# Patient Record
Sex: FEMALE | Race: WHITE | Employment: OTHER | URBAN - METROPOLITAN AREA
[De-identification: names, ages, dates, MRNs, and addresses within clinical notes are randomized per-mention and may not be internally consistent; named-entity substitution may affect disease eponyms.]

---

## 2017-02-21 ENCOUNTER — APPOINTMENT (OUTPATIENT)
Dept: MAMMOGRAPHY | Age: 47
End: 2017-02-21
Attending: OBSTETRICS & GYNECOLOGY

## 2017-02-23 ENCOUNTER — HOSPITAL ENCOUNTER (OUTPATIENT)
Dept: MAMMOGRAPHY | Age: 47
Discharge: HOME | End: 2017-02-23
Attending: OBSTETRICS & GYNECOLOGY
Payer: COMMERCIAL

## 2017-02-23 DIAGNOSIS — N63.10 BILATERAL BREAST LUMP: ICD-10-CM

## 2017-02-23 DIAGNOSIS — N63.20 BILATERAL BREAST LUMP: ICD-10-CM

## 2017-02-23 PROCEDURE — 77066 DX MAMMO INCL CAD BI: CPT

## 2017-02-23 PROCEDURE — 76642 ULTRASOUND BREAST LIMITED: CPT

## 2025-01-16 SDOH — HEALTH STABILITY: PHYSICAL HEALTH: ON AVERAGE, HOW MANY MINUTES DO YOU ENGAGE IN EXERCISE AT THIS LEVEL?: 0 MIN

## 2025-01-16 SDOH — HEALTH STABILITY: PHYSICAL HEALTH: ON AVERAGE, HOW MANY DAYS PER WEEK DO YOU ENGAGE IN MODERATE TO STRENUOUS EXERCISE (LIKE A BRISK WALK)?: 0 DAYS

## 2025-01-17 ENCOUNTER — OFFICE VISIT (OUTPATIENT)
Dept: INTERNAL MEDICINE CLINIC | Facility: CLINIC | Age: 55
End: 2025-01-17
Payer: COMMERCIAL

## 2025-01-17 VITALS
OXYGEN SATURATION: 98 % | HEART RATE: 91 BPM | SYSTOLIC BLOOD PRESSURE: 126 MMHG | TEMPERATURE: 98.2 F | BODY MASS INDEX: 28.77 KG/M2 | DIASTOLIC BLOOD PRESSURE: 86 MMHG | HEIGHT: 66 IN | WEIGHT: 179 LBS

## 2025-01-17 DIAGNOSIS — G89.29 CHRONIC RIGHT SHOULDER PAIN: ICD-10-CM

## 2025-01-17 DIAGNOSIS — M25.511 CHRONIC RIGHT SHOULDER PAIN: ICD-10-CM

## 2025-01-17 DIAGNOSIS — M75.01 ADHESIVE CAPSULITIS OF RIGHT SHOULDER: ICD-10-CM

## 2025-01-17 DIAGNOSIS — M79.671 RIGHT FOOT PAIN: ICD-10-CM

## 2025-01-17 DIAGNOSIS — Z76.89 ENCOUNTER TO ESTABLISH CARE: Primary | ICD-10-CM

## 2025-01-17 PROCEDURE — 99204 OFFICE O/P NEW MOD 45 MIN: CPT | Performed by: NURSE PRACTITIONER

## 2025-01-17 SDOH — ECONOMIC STABILITY: FOOD INSECURITY: WITHIN THE PAST 12 MONTHS, YOU WORRIED THAT YOUR FOOD WOULD RUN OUT BEFORE YOU GOT MONEY TO BUY MORE.: NEVER TRUE

## 2025-01-17 SDOH — ECONOMIC STABILITY: FOOD INSECURITY: WITHIN THE PAST 12 MONTHS, THE FOOD YOU BOUGHT JUST DIDN'T LAST AND YOU DIDN'T HAVE MONEY TO GET MORE.: NEVER TRUE

## 2025-01-17 ASSESSMENT — PATIENT HEALTH QUESTIONNAIRE - PHQ9
2. FEELING DOWN, DEPRESSED OR HOPELESS: NOT AT ALL
SUM OF ALL RESPONSES TO PHQ QUESTIONS 1-9: 0
SUM OF ALL RESPONSES TO PHQ9 QUESTIONS 1 & 2: 0
SUM OF ALL RESPONSES TO PHQ QUESTIONS 1-9: 0
1. LITTLE INTEREST OR PLEASURE IN DOING THINGS: NOT AT ALL

## 2025-01-17 ASSESSMENT — ENCOUNTER SYMPTOMS
NAUSEA: 0
VOMITING: 0
ABDOMINAL PAIN: 0
EYE PAIN: 0
DIARRHEA: 0
BACK PAIN: 0
RHINORRHEA: 0
SINUS PAIN: 0
SHORTNESS OF BREATH: 0
SORE THROAT: 0
COUGH: 0
CONSTIPATION: 0

## 2025-01-17 NOTE — PROGRESS NOTES
foot pain). Negative for back pain, gait problem and joint swelling.   Skin:  Negative for rash and wound.   Neurological:  Negative for dizziness and headaches.   Psychiatric/Behavioral:  Negative for behavioral problems, sleep disturbance and suicidal ideas. The patient is not nervous/anxious.                 Vitals:    01/17/25 0828   BP: 126/86   Site: Right Upper Arm   Position: Sitting   Cuff Size: Medium Adult   Pulse: 91   Temp: 98.2 °F (36.8 °C)   SpO2: 98%   Weight: 81.2 kg (179 lb)   Height: 1.676 m (5' 6\")              Physical Exam  Physical Exam  Constitutional:       General: She is not in acute distress.     Appearance: She is not ill-appearing.   HENT:      Head: Normocephalic and atraumatic.   Eyes:      Pupils: Pupils are equal, round, and reactive to light.   Cardiovascular:      Rate and Rhythm: Normal rate and regular rhythm.   Pulmonary:      Effort: Pulmonary effort is normal. No respiratory distress.      Breath sounds: Normal breath sounds.   Abdominal:      General: Bowel sounds are normal.      Palpations: Abdomen is soft.   Musculoskeletal:         General: Tenderness present.      Cervical back: Neck supple.        Feet:       Comments: Right shoulder joint limited active ROM - limited flexion to approx to approx 40 degrees, limited adduction/internal rotation, limited external rotation.     Feet:      Comments: R foot non edematous, non erythematous.  R foot between 4th and 5th metatarsal c/o discomfort on palpation, no palpable lump or lesion.  Skin:     General: Skin is warm and dry.   Neurological:      General: No focal deficit present.      Mental Status: She is alert and oriented to person, place, and time.   Psychiatric:         Mood and Affect: Mood normal.         Thought Content: Thought content normal.                Assessment/Plan:          ICD-10-CM    1. Chronic right shoulder pain  M25.511 XR SHOULDER RIGHT (MIN 2 VIEWS)    G89.29 [facility]

## 2025-02-07 ENCOUNTER — OFFICE VISIT (OUTPATIENT)
Dept: INTERNAL MEDICINE CLINIC | Facility: CLINIC | Age: 55
End: 2025-02-07
Payer: COMMERCIAL

## 2025-02-07 VITALS
HEIGHT: 66 IN | DIASTOLIC BLOOD PRESSURE: 80 MMHG | OXYGEN SATURATION: 99 % | HEART RATE: 66 BPM | TEMPERATURE: 98.2 F | SYSTOLIC BLOOD PRESSURE: 128 MMHG | BODY MASS INDEX: 28.9 KG/M2 | WEIGHT: 179.8 LBS

## 2025-02-07 DIAGNOSIS — Z00.00 ROUTINE GENERAL MEDICAL EXAMINATION AT A HEALTH CARE FACILITY: Primary | ICD-10-CM

## 2025-02-07 DIAGNOSIS — Z00.00 ROUTINE GENERAL MEDICAL EXAMINATION AT A HEALTH CARE FACILITY: ICD-10-CM

## 2025-02-07 LAB
ALBUMIN SERPL-MCNC: 4.3 G/DL (ref 3.5–5)
ALBUMIN/GLOB SERPL: 1.3 (ref 1–1.9)
ALP SERPL-CCNC: 65 U/L (ref 35–104)
ALT SERPL-CCNC: 45 U/L (ref 8–45)
ANION GAP SERPL CALC-SCNC: 13 MMOL/L (ref 7–16)
APPEARANCE UR: CLEAR
AST SERPL-CCNC: 40 U/L (ref 15–37)
BASOPHILS # BLD: 0.04 K/UL (ref 0–0.2)
BASOPHILS NFR BLD: 0.8 % (ref 0–2)
BILIRUB SERPL-MCNC: 0.4 MG/DL (ref 0–1.2)
BILIRUB UR QL: NEGATIVE
BUN SERPL-MCNC: 7 MG/DL (ref 6–23)
CALCIUM SERPL-MCNC: 10.3 MG/DL (ref 8.8–10.2)
CHLORIDE SERPL-SCNC: 103 MMOL/L (ref 98–107)
CHOLEST SERPL-MCNC: 317 MG/DL (ref 0–200)
CO2 SERPL-SCNC: 23 MMOL/L (ref 20–29)
COLOR UR: NORMAL
CREAT SERPL-MCNC: 0.79 MG/DL (ref 0.6–1.1)
DIFFERENTIAL METHOD BLD: ABNORMAL
EOSINOPHIL # BLD: 0.15 K/UL (ref 0–0.8)
EOSINOPHIL NFR BLD: 2.9 % (ref 0.5–7.8)
ERYTHROCYTE [DISTWIDTH] IN BLOOD BY AUTOMATED COUNT: 13.8 % (ref 11.9–14.6)
GLOBULIN SER CALC-MCNC: 3.2 G/DL (ref 2.3–3.5)
GLUCOSE SERPL-MCNC: 93 MG/DL (ref 70–99)
GLUCOSE UR STRIP.AUTO-MCNC: NEGATIVE MG/DL
HCT VFR BLD AUTO: 44.6 % (ref 35.8–46.3)
HDLC SERPL-MCNC: 82 MG/DL (ref 40–60)
HDLC SERPL: 3.9 (ref 0–5)
HGB BLD-MCNC: 14.6 G/DL (ref 11.7–15.4)
HGB UR QL STRIP: NEGATIVE
IMM GRANULOCYTES # BLD AUTO: 0.01 K/UL (ref 0–0.5)
IMM GRANULOCYTES NFR BLD AUTO: 0.2 % (ref 0–5)
KETONES UR QL STRIP.AUTO: NEGATIVE MG/DL
LDLC SERPL CALC-MCNC: 214 MG/DL (ref 0–100)
LEUKOCYTE ESTERASE UR QL STRIP.AUTO: NEGATIVE
LYMPHOCYTES # BLD: 1.64 K/UL (ref 0.5–4.6)
LYMPHOCYTES NFR BLD: 31.3 % (ref 13–44)
MCH RBC QN AUTO: 27.9 PG (ref 26.1–32.9)
MCHC RBC AUTO-ENTMCNC: 32.7 G/DL (ref 31.4–35)
MCV RBC AUTO: 85.1 FL (ref 82–102)
MONOCYTES # BLD: 0.41 K/UL (ref 0.1–1.3)
MONOCYTES NFR BLD: 7.8 % (ref 4–12)
NEUTS SEG # BLD: 2.99 K/UL (ref 1.7–8.2)
NEUTS SEG NFR BLD: 57 % (ref 43–78)
NITRITE UR QL STRIP.AUTO: NEGATIVE
NRBC # BLD: 0 K/UL (ref 0–0.2)
PH UR STRIP: 7.5 (ref 5–9)
PLATELET # BLD AUTO: 248 K/UL (ref 150–450)
PMV BLD AUTO: 10.9 FL (ref 9.4–12.3)
POTASSIUM SERPL-SCNC: 4.1 MMOL/L (ref 3.5–5.1)
PROT SERPL-MCNC: 7.5 G/DL (ref 6.3–8.2)
PROT UR STRIP-MCNC: NEGATIVE MG/DL
RBC # BLD AUTO: 5.24 M/UL (ref 4.05–5.2)
SODIUM SERPL-SCNC: 140 MMOL/L (ref 136–145)
SP GR UR REFRACTOMETRY: <1.005 (ref 1–1.02)
TRIGL SERPL-MCNC: 110 MG/DL (ref 0–150)
TSH, 3RD GENERATION: 1.5 UIU/ML (ref 0.27–4.2)
UROBILINOGEN UR QL STRIP.AUTO: 0.2 EU/DL (ref 0.2–1)
VLDLC SERPL CALC-MCNC: 22 MG/DL (ref 6–23)
WBC # BLD AUTO: 5.2 K/UL (ref 4.3–11.1)

## 2025-02-07 PROCEDURE — 99396 PREV VISIT EST AGE 40-64: CPT | Performed by: NURSE PRACTITIONER

## 2025-02-07 SDOH — ECONOMIC STABILITY: FOOD INSECURITY: WITHIN THE PAST 12 MONTHS, YOU WORRIED THAT YOUR FOOD WOULD RUN OUT BEFORE YOU GOT MONEY TO BUY MORE.: NEVER TRUE

## 2025-02-07 SDOH — ECONOMIC STABILITY: FOOD INSECURITY: WITHIN THE PAST 12 MONTHS, THE FOOD YOU BOUGHT JUST DIDN'T LAST AND YOU DIDN'T HAVE MONEY TO GET MORE.: NEVER TRUE

## 2025-02-07 ASSESSMENT — ENCOUNTER SYMPTOMS
EYE PAIN: 0
CONSTIPATION: 0
RHINORRHEA: 0
ABDOMINAL PAIN: 0
SHORTNESS OF BREATH: 0
VOMITING: 0
SORE THROAT: 0
NAUSEA: 0
COUGH: 0
SINUS PAIN: 0
BACK PAIN: 0
DIARRHEA: 0

## 2025-02-07 NOTE — PATIENT INSTRUCTIONS
Welcome our practice and to our [address] [facility] family.  Thank you for choosing us as your health care provider.  In the coming days, you may receive a survey about your visit via text or email.  Please take a few minutes to answer these questions.   As our patient, we value you opinion and appreciate your feedback   about your [employer] experience.      Thank you    [facility]  [phone]  [facility]           Please arrive 20 minutes prior to your appointment time to allow time for checking in at the  and rooming with the medical assistant. Please bring your medication bottles at each visit.

## 2025-02-07 NOTE — PROGRESS NOTES
[facility]  [address]  [facility] [address]  Tel# [phone]  Fax# [phone]     Odette Abrams, LELAND, FNP-BC  Family Nurse Practitioner            Date of Visit: 2025     Leticia Irene (: 1970) is a 54 y.o. female established patient, here for evaluation of the following chief complaint(s):    Chief Complaint   Patient presents with    Annual Exam     Patient is here for her annual physical.         Patient Care Team:  Odette Abrams, BRITTANY - CNP as PCP - General (Nurse Practitioner, Family)  Odette Abrams APRN - CNP as PCP - Empaneled Provider         History of Present Illness              Physical  Presents here for physical with fasting labs.      Diet  B - oatmeal with seeds          Right Shoulder Pain  Noticed she started having right shoulder limitation about 3 months ago.  Denies any falls or injury. Noticed when she tried to reach something off shelf at grocery store, unable to reach to it with pain. States she is right handed. Denies pain when resting. Denies repetitive movement      Has upcoming appt with Dr. ANNAMARIA Warner of [facility] 2/10/2025.                Eye exam: 2.5 years ago  Dental: due   Mammogram: years ago; states \"I don't want one\"; mother had breast CA in , negative for BRCA gene  Pap: years ago, ?  Colon screening: refused, denies family hx of colon CA         Immunization History   Administered Date(s) Administered    COVID-19, MODERNA, (age 12y+), IM, 50mcg/0.5mL 2024            Social History     Substance and Sexual Activity   Alcohol Use Yes    Alcohol/week: 2.0 standard drinks of alcohol    Types: 2 Glasses of wine per week    Comment: per week        Tobacco Use: Low Risk  (2025)    Patient History     Smoking Tobacco Use: Never     Smokeless Tobacco Use: Never     Passive Exposure: Never        Patient Active Problem List   Diagnosis    Chronic right shoulder pain    Right foot pain         Past Medical History:   Diagnosis Date

## 2025-02-10 ENCOUNTER — OFFICE VISIT (OUTPATIENT)
Dept: ORTHOPEDIC SURGERY | Age: 55
End: 2025-02-10
Payer: COMMERCIAL

## 2025-02-10 DIAGNOSIS — M75.01 ADHESIVE CAPSULITIS OF RIGHT SHOULDER: Primary | ICD-10-CM

## 2025-02-10 DIAGNOSIS — E78.2 MIXED HYPERLIPIDEMIA: Primary | ICD-10-CM

## 2025-02-10 DIAGNOSIS — M25.511 RIGHT SHOULDER PAIN, UNSPECIFIED CHRONICITY: ICD-10-CM

## 2025-02-10 PROCEDURE — 99203 OFFICE O/P NEW LOW 30 MIN: CPT | Performed by: STUDENT IN AN ORGANIZED HEALTH CARE EDUCATION/TRAINING PROGRAM

## 2025-02-10 RX ORDER — ROSUVASTATIN CALCIUM 10 MG/1
10 TABLET, COATED ORAL NIGHTLY
Qty: 90 TABLET | Refills: 0 | Status: SHIPPED | OUTPATIENT
Start: 2025-02-10

## 2025-02-10 NOTE — PATIENT INSTRUCTIONS
Frozen Shoulder  Frozen shoulder, also called adhesive capsulitis, causes pain and stiffness in the shoulder. Over time, the shoulder becomes very hard to move.    After a period of worsening symptoms, frozen shoulder tends to get better, although full recovery may take up to 3 years. Physical therapy, with a focus on shoulder flexibility, is the primary treatment recommendation for frozen shoulder.    Frozen shoulder most commonly affects people between the ages of 40 and 60, and occurs in women more often than men. In addition, people with diabetes are at an increased risk for developing frozen shoulder.    Anatomy  Your shoulder is a ball-and-socket joint made up of three bones: your upper arm bone (humerus), your shoulder blade (scapula), and your collarbone (clavicle).    The head of the upper arm bone fits into a shallow socket in your shoulder blade. Strong connective tissue, called the shoulder capsule, surrounds the joint.    To help your shoulder move more easily, synovial fluid lubricates the shoulder capsule and the joint.    Description  In frozen shoulder, the shoulder capsule thickens and becomes stiff and tight. Thick bands of tissue -- called adhesions -- develop. In many cases, there is less synovial fluid in the joint.    The hallmark signs of this condition are severe pain and being unable to move your shoulder -- either on your own or with the help of someone else. It develops in three stages:    Stage 1: Freezing  In the \"freezing\" stage, you slowly have more and more pain. As the pain worsens, your shoulder loses range of motion. Freezing typically lasts from 6 weeks to 9 months.    Stage 2: Frozen  Painful symptoms may actually improve during this stage, but the stiffness remains. During the 4 to 6 months of the \"frozen\" stage, daily activities may be very difficult.    Stage 3: Thawing  Shoulder motion slowly improves during the \"thawing\" stage. Complete return to normal or close to

## 2025-02-10 NOTE — PROGRESS NOTES
Name: Leticia Irene  YOB: 1970  Gender: female  MRN: [id number]  Date of Encounter:  2/10/2025       CHIEF COMPLAINT:     Chief Complaint   Patient presents with    Shoulder Pain     Right        SUBJECTIVE/OBJECTIVE:      HPI:    Leticia Irene  is a 54 y.o. pleasant female who presents today for a new evaluation of her right shoulder pain.    She presents for approximately 3 months of right shoulder pain.  There was no history of trauma. She has fiarly minimal discomfort but poor range of motion of the shoulder, making ADLs difficult. She was told by her PCP she has frozen shoulder. She has had no formal treatment at this point.      PAST HISTORY:   Past medical, surgical, family, social history and allergies reviewed by me.   Tobacco use:  reports that she has never smoked. She has never been exposed to tobacco smoke. She has never used smokeless tobacco.  Diabetes: none  No results found for: \"LABA1C\"      REVIEW OF SYSTEMS:   As noted in HPI.     PHYSICAL EXAMINATION:     Gen: Well-developed, no acute distress   HEENT: NC/AT, EOMI   Neck: Trachea midline, normal ROM   CV: Regular rhythm by palpation of distal pulse, normal capillary refill   Pulm: No respiratory distress, no stridor   Psychiatric: Well oriented, normal mood and affect.   Skin: No rashes, lesions or ulcers, normal temperature, turgor, and texture on uninvolved extremity.      ORTHO EXAM:    Right Shoulder:     Inspection: no biceps deformity; no notable atrophy or erythema  ROM active  passive: FF and abduction approximately 100, both actively and passively.  Ex rotation is decreased, 30. Int rotation: Back pocket  Tenderness: No tenderness  Strength: Abduction 5/5, External rotation 5/5, Internal rotation 5/5  Provocative tests: Negative Belly press.  Empty can without significant discomfort.  There is discomfort with Khan.  Normal capillary refill / 2+ radial pulse   Sensation intact to light touch

## 2025-03-03 ENCOUNTER — HOSPITAL ENCOUNTER (OUTPATIENT)
Dept: PHYSICAL THERAPY | Age: 55
Setting detail: RECURRING SERIES
Discharge: HOME | End: 2025-03-06
Attending: STUDENT IN AN ORGANIZED HEALTH CARE EDUCATION/TRAINING PROGRAM
Payer: COMMERCIAL

## 2025-03-03 DIAGNOSIS — M25.511 PAIN IN SHOULDER REGION, RIGHT: Primary | ICD-10-CM

## 2025-03-03 DIAGNOSIS — M62.81 MUSCLE WEAKNESS (GENERALIZED): ICD-10-CM

## 2025-03-03 DIAGNOSIS — M25.611 STIFFNESS OF RIGHT SHOULDER, NOT ELSEWHERE CLASSIFIED: ICD-10-CM

## 2025-03-03 PROCEDURE — 97140 MANUAL THERAPY 1/> REGIONS: CPT

## 2025-03-03 PROCEDURE — 97110 THERAPEUTIC EXERCISES: CPT

## 2025-03-03 PROCEDURE — 97161 PT EVAL LOW COMPLEX 20 MIN: CPT

## 2025-03-03 ASSESSMENT — PAIN SCALES - GENERAL: PAINLEVEL_OUTOF10: 2

## 2025-03-03 NOTE — PROGRESS NOTES
[facility] DEP   2/13/2026 10:00 AM Odette Abrams, APRN - CNP [facility] DEP      Access Code: [email or web address]  URL: [email or web address]  Date: 03/03/2025  Prepared by: Aleida Mitchell    Exercises  - Seated Thoracic Lumbar Extension with Pectoralis Stretch  - 2 x daily - 7 x weekly - 1 sets - 10 reps - 5 hold  - Standing 'L' Stretch at Counter  - 2 x daily - 7 x weekly - 1 sets - 10 reps - 5 hold  - Seated Shoulder Flexion AAROM with Pulley Behind  - 2 x daily - 7 x weekly - 2 sets - 10 reps - 5 hold  - Seated Shoulder Abduction AAROM with Pulley Behind  - 2 x daily - 7 x weekly - 2 sets - 10 reps - 5 hold  - Seated Upper Trapezius Stretch  - 2 x daily - 7 x weekly - 1 sets - 10 reps - 5 hold  - Seated Levator Scapulae Stretch  - 2 x daily - 7 x weekly - 1 sets - 10 reps - 5 hold  - Seated Shoulder External Rotation PROM on Table  - 2 x daily - 7 x weekly - 1 sets - 10 reps - 5 hold  - Standing Shoulder Internal Rotation AAROM with Dowel  - 2 x daily - 7 x weekly - 1 sets - 10 reps - 5 hold

## 2025-03-03 NOTE — THERAPY EVALUATION
Leticia Irene  : 1970  Primary: [place] [employer] [place] [employer])  Secondary:  [facility] [place] [address]  [address] [place] [address]  Phone: [phone]  Fax: [phone] Plan Frequency: 1-2x/wk for 6 weeks  Plan of Care/Certification Expiration Date: 25        Plan of Care/Certification Expiration Date:  Plan of Care/Certification Expiration Date: 25    Frequency/Duration: Plan Frequency: 1-2x/wk for 6 weeks      Time In/Out:   Time In: 0900  Time Out: 09      PT Visit Info:    Total # of Visits to Date: 1      Visit Count:  1                OUTPATIENT PHYSICAL THERAPY:             Initial Assessment 3/3/2025               Episode (Frozen shoulder R)         Treatment Diagnosis:     Pain in shoulder region, right  Stiffness of right shoulder, not elsewhere classified  Muscle weakness (generalized)  Medical/Referring Diagnosis:    Adhesive capsulitis of right shoulder [M75.01]    Referring Physician:  Rabia Warner MD MD Orders:  PT Eval and Treat, 2-3x/wk for 6 weeks   Return MD Appt:  3/31/25  Date of Onset:  Onset Date: 10/01/24     Allergies:  Erythromycin  Restrictions/Precautions:    None      Medications Last Reviewed: 3/3/2025     SUBJECTIVE   History of Injury/Illness (Reason for Referral):  Pt reports she is unsure of how it began but she noticed it 10/24 while trying to reach for an item on a high shelf in whole foods. Notes she has been taking a supplement to help with inflammation and after taking it for 50 days it has helped some. MD offered an injection which she declined for now. Was given some stretches but has not started them yet.   Patient Stated Goal(s):  \"improve function of her arm\"  Initial Pain Level:      2/10   Post Session Pain Level:     3/10  Past Medical History/Comorbidities:   Ms. Irene  has a past medical history of COVID-19, Cystitis, Hearing loss, Hematuria, and UTI (urinary tract infection).  Ms. Irene  has a past

## 2025-03-10 ENCOUNTER — HOSPITAL ENCOUNTER (OUTPATIENT)
Dept: PHYSICAL THERAPY | Age: 55
Setting detail: RECURRING SERIES
Discharge: HOME | End: 2025-03-13
Attending: STUDENT IN AN ORGANIZED HEALTH CARE EDUCATION/TRAINING PROGRAM
Payer: COMMERCIAL

## 2025-03-10 PROCEDURE — 97110 THERAPEUTIC EXERCISES: CPT

## 2025-03-10 PROCEDURE — 97140 MANUAL THERAPY 1/> REGIONS: CPT

## 2025-03-10 ASSESSMENT — PAIN SCALES - GENERAL: PAINLEVEL_OUTOF10: 0

## 2025-03-10 NOTE — PROGRESS NOTES
Leticia Irene  : 1970  Primary: [place] [employer] ([place] [employer])  Secondary:  O [facility]  [address]  [address]  [facility] [address]  Phone: [phone]  Fax: [phone] Plan Frequency: 1-2x/wk for 6 weeks  Plan of Care/Certification Expiration Date: 25        Plan of Care/Certification Expiration Date:  Plan of Care/Certification Expiration Date: 25    Frequency/Duration: Plan Frequency: 1-2x/wk for 6 weeks      Time In/Out:   Time In: 855  Time Out: 947      PT Visit Info:    Total # of Visits to Date: 2      Visit Count:  2    OUTPATIENT PHYSICAL THERAPY:   Treatment Note 3/10/2025       Episode  (Frozen shoulder R)               Treatment Diagnosis:    Pain in shoulder region, right  Stiffness of right shoulder, not elsewhere classified  Muscle weakness (generalized)  Medical/Referring Diagnosis:    Adhesive capsulitis of right shoulder [M75.01]    Referring Physician:  Rabia Warner MD MD Orders:  PT Eval and Treat, 2-3x/wk for 6 weeks  Return MD Appt:  3/31/25   Date of Onset:  Onset Date: 10/01/24     Allergies:   Erythromycin  Restrictions/Precautions:   None      Interventions Planned (Treatment may consist of any combination of the following):     See Assessment Note    Subjective Comments:   Pt reports stiffness and weakness is maybe a little better.   Initial Pain Level:     0/10  Post Session Pain Level:      1/10  Medications Last Reviewed: 3/10/2025  Updated Objective Findings:  None Today  Treatment   THERAPEUTIC EXERCISE: (15 minutes):    Exercises per grid below to improve mobility, strength, and coordination.  Required minimal verbal and tactile cues to promote proper body alignment, promote proper body posture, and promote proper body mechanics.  Progressed resistance, range, repetitions, and complexity of movement as indicated.  MANUAL THERAPY: (25 minutes):   Joint mobilization and Soft tissue mobilization was utilized and necessary because of the patient's

## 2025-03-17 ENCOUNTER — HOSPITAL ENCOUNTER (OUTPATIENT)
Dept: PHYSICAL THERAPY | Age: 55
Setting detail: RECURRING SERIES
Discharge: HOME | End: 2025-03-20
Attending: STUDENT IN AN ORGANIZED HEALTH CARE EDUCATION/TRAINING PROGRAM
Payer: COMMERCIAL

## 2025-03-17 PROCEDURE — 97110 THERAPEUTIC EXERCISES: CPT

## 2025-03-17 PROCEDURE — 97140 MANUAL THERAPY 1/> REGIONS: CPT

## 2025-03-17 ASSESSMENT — PAIN SCALES - GENERAL: PAINLEVEL_OUTOF10: 0

## 2025-03-17 NOTE — PROGRESS NOTES
Leticia Irene  : 1970  Primary: [place] [employer] ([place] [employer])  Secondary:  O [facility]  [address]  [address]  [facility] [address]  Phone: [phone]  Fax: [phone] Plan Frequency: 1-2x/wk for 6 weeks  Plan of Care/Certification Expiration Date: 25        Plan of Care/Certification Expiration Date:  Plan of Care/Certification Expiration Date: 25    Frequency/Duration: Plan Frequency: 1-2x/wk for 6 weeks      Time In/Out:   Time In: 0815  Time Out: 0900      PT Visit Info:    Total # of Visits to Date: 3      Visit Count:  3    OUTPATIENT PHYSICAL THERAPY:   Treatment Note 3/17/2025       Episode  (Frozen shoulder R)               Treatment Diagnosis:    Pain in shoulder region, right  Stiffness of right shoulder, not elsewhere classified  Muscle weakness (generalized)  Medical/Referring Diagnosis:    Adhesive capsulitis of right shoulder [M75.01]    Referring Physician:  Rabia Warner MD MD Orders:  PT Eval and Treat, 2-3x/wk for 6 weeks  Return MD Appt:  3/31/25   Date of Onset:  Onset Date: 10/01/24     Allergies:   Erythromycin  Restrictions/Precautions:   None      Interventions Planned (Treatment may consist of any combination of the following):     See Assessment Note    Subjective Comments:   Pt reports ROM is improving.  Initial Pain Level:     0/10  Post Session Pain Level:      0/10  Medications Last Reviewed: 3/17/2025  Updated Objective Findings:   ABD: 95* against gravity, 165* supine, FLEX: 120* against gravity, 140* supine, ER: 40*, IR: 70*  Treatment   THERAPEUTIC EXERCISE: (30 minutes):    Exercises per grid below to improve mobility, strength, and coordination.  Required minimal verbal and tactile cues to promote proper body alignment, promote proper body posture, and promote proper body mechanics.  Progressed resistance, range, repetitions, and complexity of movement as indicated.  MANUAL THERAPY: (10 minutes):   Joint mobilization and Soft tissue

## 2025-03-24 ENCOUNTER — HOSPITAL ENCOUNTER (OUTPATIENT)
Dept: PHYSICAL THERAPY | Age: 55
Setting detail: RECURRING SERIES
Discharge: HOME | End: 2025-03-27
Attending: STUDENT IN AN ORGANIZED HEALTH CARE EDUCATION/TRAINING PROGRAM
Payer: COMMERCIAL

## 2025-03-24 PROCEDURE — 97140 MANUAL THERAPY 1/> REGIONS: CPT

## 2025-03-24 PROCEDURE — 97110 THERAPEUTIC EXERCISES: CPT

## 2025-03-24 ASSESSMENT — PAIN SCALES - GENERAL: PAINLEVEL_OUTOF10: 0

## 2025-03-24 NOTE — PROGRESS NOTES
Leticia Irene  : 1970  Primary: [place] [employer] ([employer])  Secondary:  O [facility]  [address]  [address] [place] [address]  Phone: [phone]  Fax: [phone] Plan Frequency: 1-2x/wk for 6 weeks  Plan of Care/Certification Expiration Date: 25        Plan of Care/Certification Expiration Date:  Plan of Care/Certification Expiration Date: 25    Frequency/Duration: Plan Frequency: 1-2x/wk for 6 weeks      Time In/Out:   Time In: 0815  Time Out: 0900      PT Visit Info:    Total # of Visits to Date: 4      Visit Count:  4    OUTPATIENT PHYSICAL THERAPY:   Treatment Note 3/24/2025       Episode  (Frozen shoulder R)               Treatment Diagnosis:    Pain in shoulder region, right  Stiffness of right shoulder, not elsewhere classified  Muscle weakness (generalized)  Medical/Referring Diagnosis:    Adhesive capsulitis of right shoulder [M75.01]    Referring Physician:  Rabia Warner MD MD Orders:  PT Eval and Treat, 2-3x/wk for 6 weeks  Return MD Appt:  3/31/25   Date of Onset:  Onset Date: 10/01/24     Allergies:   Erythromycin  Restrictions/Precautions:   None      Interventions Planned (Treatment may consist of any combination of the following):     See Assessment Note    Subjective Comments:   Pt reports she can tell she is improving. Still has end range stiffness though.   Initial Pain Level:     0/10  Post Session Pain Level:      0/10  Medications Last Reviewed: 3/24/2025  Updated Objective Findings:   ABD: 95* against gravity, 165* supine, FLEX: 120* against gravity, 140* supine, ER: 40*, IR: 70*  Treatment   THERAPEUTIC EXERCISE: (30 minutes):    Exercises per grid below to improve mobility, strength, and coordination.  Required minimal verbal and tactile cues to promote proper body alignment, promote proper body posture, and promote proper body mechanics.  Progressed resistance, range, repetitions, and complexity of movement as indicated.  MANUAL THERAPY: (10

## 2025-03-28 NOTE — PROGRESS NOTES
Name: Leticia Irene  YOB: 1970  Gender: female  MRN: [id number]  Date of Encounter:  3/31/2025       CHIEF COMPLAINT:     Chief Complaint   Patient presents with    Follow-up     R Shoulder        SUBJECTIVE/OBJECTIVE:      HPI:    Patient is a 54 y.o. pleasant female who presents today for a return evaluation of her right shoulder.    Working diagnosis:   1. Adhesive capsulitis of right shoulder       LOV: 2/10/2025     Recall hx: She presents for approximately 3 months of right shoulder pain.  There was no history of trauma. She has fiarly minimal discomfort but poor range of motion of the shoulder, making ADLs difficult. She was told by her PCP she has frozen shoulder. She has had no formal treatment at this point.     2/10/25: XR negative. Exam consistent with adhesive capsulitis. Declined injection. PT ordered.   3/31/25: She has attended 4 PT visits with increased ROM.  She believes that she is improving faster than she expected.  She is not having any discomfort reported PT.       PAST HISTORY:   Past medical, surgical, family, social history and allergies reviewed by me. Unchanged from prior visit.     REVIEW OF SYSTEMS:   As noted in HPI.     PHYSICAL EXAMINATION:     Gen: Well-developed, no acute distress   HEENT: NC/AT, EOMI   Neck: Trachea midline, normal ROM   CV: Regular rhythm by palpation of distal pulse, normal capillary refill   Pulm: No respiratory distress, no stridor   Psychiatric: Well oriented, normal mood and affect.   Skin: No rashes, lesions or ulcers, normal temperature, turgor, and texture on uninvolved extremity.      ORTHO EXAM:    Right Shoulder:      Inspection: no biceps deformity; no notable atrophy or erythema  ROM active  passive: FF is improving, 110. Abduction 90. External rotation 35.. Int rotation: sacrum  Tenderness: No tenderness  Strength: Abduction 5/5, External rotation 5/5, Internal rotation 5/5  Provocative tests: Negative Belly press.  Empty

## 2025-03-31 ENCOUNTER — OFFICE VISIT (OUTPATIENT)
Dept: ORTHOPEDIC SURGERY | Age: 55
End: 2025-03-31
Payer: COMMERCIAL

## 2025-03-31 DIAGNOSIS — M75.01 ADHESIVE CAPSULITIS OF RIGHT SHOULDER: Primary | ICD-10-CM

## 2025-03-31 PROCEDURE — 99213 OFFICE O/P EST LOW 20 MIN: CPT | Performed by: STUDENT IN AN ORGANIZED HEALTH CARE EDUCATION/TRAINING PROGRAM

## 2025-04-01 ENCOUNTER — APPOINTMENT (OUTPATIENT)
Dept: PHYSICAL THERAPY | Age: 55
End: 2025-04-01
Attending: STUDENT IN AN ORGANIZED HEALTH CARE EDUCATION/TRAINING PROGRAM
Payer: COMMERCIAL

## 2025-04-07 ENCOUNTER — HOSPITAL ENCOUNTER (OUTPATIENT)
Dept: PHYSICAL THERAPY | Age: 55
Setting detail: RECURRING SERIES
Discharge: HOME | End: 2025-04-10
Attending: STUDENT IN AN ORGANIZED HEALTH CARE EDUCATION/TRAINING PROGRAM
Payer: COMMERCIAL

## 2025-04-07 PROCEDURE — 97140 MANUAL THERAPY 1/> REGIONS: CPT

## 2025-04-07 PROCEDURE — 97110 THERAPEUTIC EXERCISES: CPT

## 2025-04-07 ASSESSMENT — PAIN SCALES - GENERAL: PAINLEVEL_OUTOF10: 0

## 2025-04-07 NOTE — PROGRESS NOTES
minutes):   Joint mobilization and Soft tissue mobilization was utilized and necessary because of the patient's restricted joint motion, painful spasm, loss of articular motion, and restricted motion of soft tissue.   Date:  3/3/25 3/10/25 Date:  3/17/25 Date:  3/24/25 Date:  4/7/25   Parameters Parameters Parameters Parameters Parameters   Mobilizations to R shoulder in supine: long axis, posterior, inferior  Also mobilized shoulder blade w/ subscap stretch and upward rotation Mobilizations to R shoulder: long axis, posterior, inferior  PROM into flexion w/ lat stretch in supine  STM to R upper trap and levator as well as subscap in supine PROM into flexion, abduction, ER, and IR to tolerance in supine  Prone thoracic mobilizations  PROM into flexion, abduction, ER, IR to tolerance   Prone thoracic mobilizations STM to R lat and subscap in supine w/ stretching     MODALITIES:             Date:  3/3/25 Date:  3/10/25 Date:  3/17/25 Date:  3/24/25 Date:  4/7/25   Activity/Exercise Parameters Parameters Parameters Parameters Parameters   UBE  3/3 1.5 3/3 2.0 3/3 2.0 3/3 2.0   Pulley 5x flexion, 5x abduction 10x flexion, 10x abduction, 10x IR 10x flexion, 10x abduction     Wand stretching 5x into IR behind back  2# wand into flexion supine 2# wand into flexion supine Flexion w/ ranger 20x in stand   ER stretch 5x at table 10x sidelying 20x sidelying 20x sidelying W/ ranger 20x   IR stretch  10x sidelying 20x sidelying 20x sidelying    Abduction   20x sidelying 1#  20x sidelying 2# Abduction w/ ranger 20x in stand   ER   20x 1# sidelying 20x 2# sidelying    IR   20x 1# sidelying 20x 2# sidelying    Lat pull     2x10 seated (10#) and standing (7#)   Wall lucy     5x   Serratus press   20x supine 1# 20x supine 2#    Eccentrics    10x flexion w/ slow lower after PT assist to end range    Thoracic rotation    10x at wall    Thoracic extension 5x in chair       Table L stretch 5x focusing on thoracic ext and shoulder flx

## 2025-04-14 ENCOUNTER — RESULTS FOLLOW-UP (OUTPATIENT)
Dept: INTERNAL MEDICINE CLINIC | Facility: CLINIC | Age: 55
End: 2025-04-14

## 2025-04-14 ENCOUNTER — HOSPITAL ENCOUNTER (OUTPATIENT)
Dept: PHYSICAL THERAPY | Age: 55
Setting detail: RECURRING SERIES
Discharge: HOME | End: 2025-04-17
Attending: STUDENT IN AN ORGANIZED HEALTH CARE EDUCATION/TRAINING PROGRAM
Payer: COMMERCIAL

## 2025-04-14 ENCOUNTER — OFFICE VISIT (OUTPATIENT)
Dept: INTERNAL MEDICINE CLINIC | Facility: CLINIC | Age: 55
End: 2025-04-14
Payer: COMMERCIAL

## 2025-04-14 VITALS
BODY MASS INDEX: 29.54 KG/M2 | WEIGHT: 183.8 LBS | SYSTOLIC BLOOD PRESSURE: 127 MMHG | OXYGEN SATURATION: 98 % | HEIGHT: 66 IN | HEART RATE: 84 BPM | DIASTOLIC BLOOD PRESSURE: 85 MMHG | TEMPERATURE: 99.7 F

## 2025-04-14 DIAGNOSIS — R35.0 URINARY FREQUENCY: ICD-10-CM

## 2025-04-14 DIAGNOSIS — R39.15 URINARY URGENCY: ICD-10-CM

## 2025-04-14 DIAGNOSIS — N32.81 OVERACTIVE BLADDER: Primary | ICD-10-CM

## 2025-04-14 LAB
BILIRUBIN, URINE, POC: NEGATIVE
BLOOD URINE, POC: NEGATIVE
GLUCOSE URINE, POC: NEGATIVE
KETONES, URINE, POC: NEGATIVE
LEUKOCYTE ESTERASE, URINE, POC: NEGATIVE
NITRITE, URINE, POC: NEGATIVE
PH, URINE, POC: 7.5 (ref 4.6–8)
PROTEIN,URINE, POC: NEGATIVE
SPECIFIC GRAVITY, URINE, POC: 1.01 (ref 1–1.03)
URINALYSIS CLARITY, POC: CLEAR
URINALYSIS COLOR, POC: YELLOW
UROBILINOGEN, POC: NORMAL

## 2025-04-14 PROCEDURE — 81003 URINALYSIS AUTO W/O SCOPE: CPT | Performed by: NURSE PRACTITIONER

## 2025-04-14 PROCEDURE — 97110 THERAPEUTIC EXERCISES: CPT

## 2025-04-14 PROCEDURE — 97140 MANUAL THERAPY 1/> REGIONS: CPT

## 2025-04-14 PROCEDURE — 99213 OFFICE O/P EST LOW 20 MIN: CPT | Performed by: NURSE PRACTITIONER

## 2025-04-14 RX ORDER — MIRABEGRON 25 MG/1
25 TABLET, FILM COATED, EXTENDED RELEASE ORAL DAILY
Qty: 30 TABLET | Refills: 2 | Status: SHIPPED | OUTPATIENT
Start: 2025-04-14

## 2025-04-14 ASSESSMENT — ENCOUNTER SYMPTOMS
VOMITING: 0
NAUSEA: 0
SINUS PAIN: 0
SORE THROAT: 0
CONSTIPATION: 0
ABDOMINAL PAIN: 0
SHORTNESS OF BREATH: 0
DIARRHEA: 0
EYE PAIN: 0
COUGH: 0
BACK PAIN: 0
RHINORRHEA: 0

## 2025-04-14 ASSESSMENT — PAIN SCALES - GENERAL: PAINLEVEL_OUTOF10: 0

## 2025-04-14 NOTE — PROGRESS NOTES
Leticia CONTEH Maria Victoria  : 1970  Primary: [place] [employer] ([place] [employer])  Secondary:  O [facility]  [address]  [address]  [facility] [address]  Phone: [phone]  Fax: [phone] Plan Frequency: 1-2x/wk for 6 weeks  Plan of Care/Certification Expiration Date: 25        Plan of Care/Certification Expiration Date:  Plan of Care/Certification Expiration Date: 25    Frequency/Duration: Plan Frequency: 1-2x/wk for 6 weeks      Time In/Out:   Time In: 0900  Time Out: 09      PT Visit Info:    Total # of Visits to Date: 6      Visit Count:  6    OUTPATIENT PHYSICAL THERAPY:   Treatment Note 2025       Episode  (Frozen shoulder R)               Treatment Diagnosis:    Pain in shoulder region, right  Stiffness of right shoulder, not elsewhere classified  Muscle weakness (generalized)  Medical/Referring Diagnosis:    Adhesive capsulitis of right shoulder [M75.01]    Referring Physician:  Rabia Warner MD MD Orders:  PT Eval and Treat, 2-3x/wk for 6 weeks  Return MD Appt:  3/31/25   Date of Onset:  Onset Date: 10/01/24     Allergies:   Erythromycin  Restrictions/Precautions:   None      Interventions Planned (Treatment may consist of any combination of the following):     See Assessment Note    Subjective Comments:   Pt reports she had a huge improvement with dry needles.   Initial Pain Level:     0/10  Post Session Pain Level:      0/10  Medications Last Reviewed: 2025  Updated Objective Findings:   ABD: 95* against gravity, 165* supine, FLEX: 120* against gravity, 140* supine, ER: 40*, IR: 70*  Treatment   THERAPEUTIC EXERCISE: (30 minutes):    Exercises per grid below to improve mobility, strength, and coordination.  Required minimal verbal and tactile cues to promote proper body alignment, promote proper body posture, and promote proper body mechanics.  Progressed resistance, range, repetitions, and complexity of movement as indicated.  MANUAL THERAPY: (10 minutes):   Joint

## 2025-04-14 NOTE — THERAPY DISCHARGE
Leticia Irene  : 1970  Primary: [employer]  Secondary:  [place] [address]  [address] [place] [address]  Phone: [phone]  Fax: [phone] Plan Frequency: 1-2x/wk for 6 weeks  Plan of Care/Certification Expiration Date: 25        Plan of Care/Certification Expiration Date:  Plan of Care/Certification Expiration Date: 25    Frequency/Duration: Plan Frequency: 1-2x/wk for 6 weeks      Time In/Out:   Time In: 0900  Time Out: 09      PT Visit Info:    Total # of Visits to Date: 6      Visit Count:  6                OUTPATIENT PHYSICAL THERAPY:             Discharge Summary 2025                Episode (Frozen shoulder R)         Treatment Diagnosis:     Pain in shoulder region, right  Stiffness of right shoulder, not elsewhere classified  Muscle weakness (generalized)  Medical/Referring Diagnosis:    Adhesive capsulitis of right shoulder [M75.01]    Referring Physician:  Rabia Warner MD MD Orders:  PT Eval and Treat, 2-3x/wk for 6 weeks   Return MD Appt:  3/31/25  Date of Onset:  Onset Date: 10/01/24     Allergies:  Erythromycin  Restrictions/Precautions:    None      Medications Last Reviewed: 2025     SUBJECTIVE   History of Injury/Illness (Reason for Referral):  Pt reports she is unsure of how it began but she noticed it 10/24 while trying to reach for an item on a high shelf in [employer]. Notes she has been taking a supplement to help with inflammation and after taking it for 50 days it has helped some. MD offered an injection which she declined for now. Was given some stretches but has not started them yet.   Patient Stated Goal(s):  \"improve function of her arm\"  Initial Pain Level:      0/10   Post Session Pain Level:     0/10  Past Medical History/Comorbidities:   Ms. Irene  has a past medical history of COVID-19, Cystitis, Hearing loss, Hematuria, and UTI (urinary tract infection).  Ms. Irene  has a past

## 2025-04-14 NOTE — PROGRESS NOTES
[facility]  [address]  [facility] [address]  Tel# [phone]  Fax# [phone]     Odette Abrams, LELAND, FNP-BC  Family Nurse Practitioner            Date of Visit: 2025     Leticia Irene (: 1970) is a 54 y.o. female established patient, here for evaluation of the following chief complaint(s):    Chief Complaint   Patient presents with    Urinary Tract Infection         Patient Care Team:  Odette Abrams APRN - CNP as PCP - General (Nurse Practitioner, Family)  Odette Abrams APRN - CNP as PCP - Empaneled Provider         History of Present Illness          Acute Visit      Urinary urgency  Started suddenly about 4 days ago. Associated with some frequency. Denies any burning on urination.  States her fluid intake is decent.               HM      Immunization History   Administered Date(s) Administered    COVID-, MODERNA, , (age 12y+), IM, 50mcg/0.5mL 2024        Social History     Substance and Sexual Activity   Alcohol Use Yes    Alcohol/week: 2.0 standard drinks of alcohol    Types: 2 Glasses of wine per week    Comment: per week        Tobacco Use: Low Risk  (2025)    Patient History     Smoking Tobacco Use: Never     Smokeless Tobacco Use: Never     Passive Exposure: Never        Patient Active Problem List   Diagnosis    Chronic right shoulder pain    Right foot pain         Past Medical History:   Diagnosis Date    COVID-19 2020    [facility]    Cystitis     Hearing loss 1980    Nerve damage, unknown cause    Hematuria     UTI (urinary tract infection)     E Coli       Past Surgical History:   Procedure Laterality Date     SECTION  2003    Also on 2006       Family History   Problem Relation Age of Onset    Breast Cancer Mother         Morgan    No Known Problems Father     Alcohol Abuse Sister         Darrin    Depression Sister     Substance Abuse Sister     Arthritis Sister     Immune Disorder Sister     Rheum

## 2025-05-09 ENCOUNTER — OFFICE VISIT (OUTPATIENT)
Dept: INTERNAL MEDICINE CLINIC | Facility: CLINIC | Age: 55
End: 2025-05-09
Payer: COMMERCIAL

## 2025-05-09 VITALS
SYSTOLIC BLOOD PRESSURE: 123 MMHG | OXYGEN SATURATION: 97 % | BODY MASS INDEX: 28.73 KG/M2 | WEIGHT: 178.8 LBS | TEMPERATURE: 98.6 F | HEART RATE: 83 BPM | DIASTOLIC BLOOD PRESSURE: 86 MMHG | HEIGHT: 66 IN

## 2025-05-09 DIAGNOSIS — E78.2 MIXED HYPERLIPIDEMIA: Primary | ICD-10-CM

## 2025-05-09 DIAGNOSIS — R74.01 ELEVATED AST (SGOT): ICD-10-CM

## 2025-05-09 LAB
ALBUMIN SERPL-MCNC: 4.1 G/DL (ref 3.5–5)
ALBUMIN/GLOB SERPL: 1.3 (ref 1–1.9)
ALP SERPL-CCNC: 77 U/L (ref 35–104)
ALT SERPL-CCNC: 24 U/L (ref 8–45)
ANION GAP SERPL CALC-SCNC: 12 MMOL/L (ref 7–16)
AST SERPL-CCNC: 27 U/L (ref 15–37)
BILIRUB SERPL-MCNC: 0.4 MG/DL (ref 0–1.2)
BUN SERPL-MCNC: 7 MG/DL (ref 6–23)
CALCIUM SERPL-MCNC: 10 MG/DL (ref 8.8–10.2)
CHLORIDE SERPL-SCNC: 105 MMOL/L (ref 98–107)
CHOLEST SERPL-MCNC: 295 MG/DL (ref 0–200)
CO2 SERPL-SCNC: 24 MMOL/L (ref 20–29)
CREAT SERPL-MCNC: 0.85 MG/DL (ref 0.6–1.1)
GLOBULIN SER CALC-MCNC: 3.2 G/DL (ref 2.3–3.5)
GLUCOSE SERPL-MCNC: 97 MG/DL (ref 70–99)
HDLC SERPL-MCNC: 80 MG/DL (ref 40–60)
HDLC SERPL: 3.7 (ref 0–5)
LDLC SERPL CALC-MCNC: 197 MG/DL (ref 0–100)
POTASSIUM SERPL-SCNC: 4.1 MMOL/L (ref 3.5–5.1)
PROT SERPL-MCNC: 7.4 G/DL (ref 6.3–8.2)
SODIUM SERPL-SCNC: 141 MMOL/L (ref 136–145)
TRIGL SERPL-MCNC: 89 MG/DL (ref 0–150)
VLDLC SERPL CALC-MCNC: 18 MG/DL (ref 6–23)

## 2025-05-09 PROCEDURE — 99214 OFFICE O/P EST MOD 30 MIN: CPT | Performed by: NURSE PRACTITIONER

## 2025-05-09 ASSESSMENT — ENCOUNTER SYMPTOMS
VOMITING: 0
COUGH: 0
SORE THROAT: 0
ABDOMINAL PAIN: 0
SINUS PAIN: 0
SHORTNESS OF BREATH: 0
EYE PAIN: 0
RHINORRHEA: 0
DIARRHEA: 0
CONSTIPATION: 0
NAUSEA: 0

## 2025-05-09 NOTE — PROGRESS NOTES
[facility]  [address]  [facility] [address]  Tel# [phone]  Fax# [phone]     Odette Abrams, LELAND, FNP-BC  Family Nurse Practitioner            Date of Visit: 2025     Leticia Irene (: 1970) is a 54 y.o. female established patient, here for evaluation of the following chief complaint(s):    Chief Complaint   Patient presents with   • overactive bladder         Patient Care Team:  Odette Abrams APRN - CNP as PCP - General (Nurse Practitioner, Family)  Odette Abrams APRN - CNP as PCP - Empaneled Provider         History of Present Illness          Presents here for follow up on hyperlipidemia and urinary frequency. Pt fasting today.        Hyperlipidemia      2025  Has been juicing with fruits/veges (home made -- beet, greens, lemon, red gala apple).  Previously Rx'd Rosuvastatin (pt not taking).      Lab Results   Component Value Date    CHOL 317 (H) 2025     Lab Results   Component Value Date    TRIG 110 2025     Lab Results   Component Value Date    HDL 82 (H) 2025     Lab Results   Component Value Date     (H) 2025     Lab Results   Component Value Date    VLDL 22 2025     Lab Results   Component Value Date    CHOLHDLRATIO 3.9 2025               Urinary urgency  Started suddenly about 4 days ago. Associated with some frequency. Denies any burning on urination.  States her fluid intake is decent.         2025  States she picked up Rx for Myrbetriq, but didn't take it.  States she was taking OTC D-mannose with cranberry, has stopped taking it and symptoms resolved.  Received a phone from urology, didn't want to schedule at this time.        Elevated AST    Lab Results   Component Value Date     2025    K 4.1 2025     2025    CO2 23 2025    BUN 7 2025    CREATININE 0.79 2025    GLUCOSE 93 2025    CALCIUM 10.3 (H) 2025    BILITOT 0.4 2025    ALKPHOS 65

## 2025-05-09 NOTE — PATIENT INSTRUCTIONS
Please arrive 20 minutes prior to your appointment time to allow time for checking in at the  and rooming with the medical assistant. Please bring your medication bottles at each visit.

## 2025-05-12 ENCOUNTER — RESULTS FOLLOW-UP (OUTPATIENT)
Dept: INTERNAL MEDICINE CLINIC | Facility: CLINIC | Age: 55
End: 2025-05-12

## 2025-05-30 NOTE — PROGRESS NOTES
Name: Leticia Irene  YOB: 1970  Gender: female  MRN: [id number]  Date of Encounter:  6/2/2025       CHIEF COMPLAINT:     Chief Complaint   Patient presents with    Follow-up     R Shoulder        SUBJECTIVE/OBJECTIVE:      HPI:    Patient is a 55 y.o. pleasant female who presents today for a return evaluation of her right shoulder.    Working diagnosis:   1. Adhesive capsulitis of right shoulder       LOV: 3/31/2025     Recall hx: She presents for approximately 3 months of right shoulder pain.  There was no history of trauma. She has fiarly minimal discomfort but poor range of motion of the shoulder, making ADLs difficult. She was told by her PCP she has frozen shoulder. She has had no formal treatment at this point.      2/10/25: XR negative. Exam consistent with adhesive capsulitis. Declined injection. PT ordered.   3/31/25: She has attended 4 PT visits with increased ROM.  She believes that she is improving faster than she expected.  She is not having any discomfort reported PT. Continue HEP. Follow up in 2 months.   6/2/25: She is doing great.  She has some stiffness upon waking but otherwise has noted continued improvement with her motion.  She has no pain.       PAST HISTORY:   Past medical, surgical, family, social history and allergies reviewed by me. Unchanged from prior visit.     REVIEW OF SYSTEMS:   As noted in HPI.     PHYSICAL EXAMINATION:     Gen: Well-developed, no acute distress   HEENT: NC/AT, EOMI   Neck: Trachea midline, normal ROM   CV: Regular rhythm by palpation of distal pulse, normal capillary refill   Pulm: No respiratory distress, no stridor   Psychiatric: Well oriented, normal mood and affect.   Skin: No rashes, lesions or ulcers, normal temperature, turgor, and texture on uninvolved extremity.      ORTHO EXAM:    Right Shoulder:      Inspection: no biceps deformity; no notable atrophy or erythema  ROM active  passive: FF is 1 nearly full 160. Abduction 130.

## 2025-06-02 ENCOUNTER — OFFICE VISIT (OUTPATIENT)
Dept: ORTHOPEDIC SURGERY | Age: 55
End: 2025-06-02
Payer: COMMERCIAL

## 2025-06-02 DIAGNOSIS — M75.01 ADHESIVE CAPSULITIS OF RIGHT SHOULDER: Primary | ICD-10-CM

## 2025-06-02 PROCEDURE — 99213 OFFICE O/P EST LOW 20 MIN: CPT | Performed by: STUDENT IN AN ORGANIZED HEALTH CARE EDUCATION/TRAINING PROGRAM

## 2025-08-22 ENCOUNTER — OFFICE VISIT (OUTPATIENT)
Dept: INTERNAL MEDICINE CLINIC | Facility: CLINIC | Age: 55
End: 2025-08-22
Payer: COMMERCIAL

## 2025-08-22 VITALS
OXYGEN SATURATION: 97 % | WEIGHT: 179.7 LBS | TEMPERATURE: 98.4 F | DIASTOLIC BLOOD PRESSURE: 86 MMHG | HEART RATE: 95 BPM | BODY MASS INDEX: 28.88 KG/M2 | SYSTOLIC BLOOD PRESSURE: 121 MMHG | HEIGHT: 66 IN

## 2025-08-22 DIAGNOSIS — E78.2 MIXED HYPERLIPIDEMIA: Primary | ICD-10-CM

## 2025-08-22 DIAGNOSIS — Z12.11 SCREENING FOR COLON CANCER: ICD-10-CM

## 2025-08-22 DIAGNOSIS — R74.01 ELEVATED AST (SGOT): ICD-10-CM

## 2025-08-22 LAB
ALBUMIN SERPL-MCNC: 4.2 G/DL (ref 3.5–5)
ALBUMIN/GLOB SERPL: 1.3 (ref 1–1.9)
ALP SERPL-CCNC: 69 U/L (ref 35–104)
ALT SERPL-CCNC: 28 U/L (ref 8–45)
ANION GAP SERPL CALC-SCNC: 12 MMOL/L (ref 7–16)
AST SERPL-CCNC: 28 U/L (ref 15–37)
BILIRUB SERPL-MCNC: 0.4 MG/DL (ref 0–1.2)
BUN SERPL-MCNC: 8 MG/DL (ref 6–23)
CALCIUM SERPL-MCNC: 10.4 MG/DL (ref 8.8–10.2)
CHLORIDE SERPL-SCNC: 104 MMOL/L (ref 98–107)
CHOLEST SERPL-MCNC: 293 MG/DL (ref 0–200)
CO2 SERPL-SCNC: 25 MMOL/L (ref 20–29)
CREAT SERPL-MCNC: 0.89 MG/DL (ref 0.6–1.1)
GLOBULIN SER CALC-MCNC: 3.3 G/DL (ref 2.3–3.5)
GLUCOSE SERPL-MCNC: 100 MG/DL (ref 70–99)
HDLC SERPL-MCNC: 75 MG/DL (ref 40–60)
HDLC SERPL: 3.9 (ref 0–5)
LDLC SERPL CALC-MCNC: 194 MG/DL (ref 0–100)
POTASSIUM SERPL-SCNC: 4.5 MMOL/L (ref 3.5–5.1)
PROT SERPL-MCNC: 7.5 G/DL (ref 6.3–8.2)
SODIUM SERPL-SCNC: 141 MMOL/L (ref 136–145)
TRIGL SERPL-MCNC: 120 MG/DL (ref 0–150)
VLDLC SERPL CALC-MCNC: 24 MG/DL (ref 6–23)

## 2025-08-22 PROCEDURE — 99213 OFFICE O/P EST LOW 20 MIN: CPT | Performed by: NURSE PRACTITIONER

## 2025-08-22 RX ORDER — DOXYCYCLINE 100 MG/1
CAPSULE ORAL
COMMUNITY
Start: 2025-08-20 | End: 2025-08-22 | Stop reason: ALTCHOICE

## 2025-08-22 RX ORDER — NEOMYCIN SULFATE, POLYMYXIN B SULFATE, AND DEXAMETHASONE 3.5; 10000; 1 MG/G; [USP'U]/G; MG/G
OINTMENT OPHTHALMIC
COMMUNITY
Start: 2025-08-20

## 2025-08-22 ASSESSMENT — ENCOUNTER SYMPTOMS
COUGH: 0
SHORTNESS OF BREATH: 0
ABDOMINAL PAIN: 0

## 2025-08-25 DIAGNOSIS — R73.09 OTHER ABNORMAL GLUCOSE: ICD-10-CM

## 2025-08-26 ENCOUNTER — TELEPHONE (OUTPATIENT)
Dept: INTERNAL MEDICINE CLINIC | Facility: CLINIC | Age: 55
End: 2025-08-26

## 2025-08-26 DIAGNOSIS — R73.09 OTHER ABNORMAL GLUCOSE: Primary | ICD-10-CM

## 2025-08-27 ENCOUNTER — LAB (OUTPATIENT)
Dept: INTERNAL MEDICINE CLINIC | Facility: CLINIC | Age: 55
End: 2025-08-27

## 2025-08-27 DIAGNOSIS — R73.09 OTHER ABNORMAL GLUCOSE: ICD-10-CM

## 2025-08-27 LAB
EST. AVERAGE GLUCOSE BLD GHB EST-MCNC: 116 MG/DL
HBA1C MFR BLD: 5.7 % (ref 0–5.6)